# Patient Record
Sex: FEMALE | Race: OTHER | Employment: UNEMPLOYED | URBAN - METROPOLITAN AREA
[De-identification: names, ages, dates, MRNs, and addresses within clinical notes are randomized per-mention and may not be internally consistent; named-entity substitution may affect disease eponyms.]

---

## 2019-04-04 ENCOUNTER — APPOINTMENT (OUTPATIENT)
Dept: ULTRASOUND IMAGING | Age: 39
End: 2019-04-04
Attending: PHYSICIAN ASSISTANT
Payer: SELF-PAY

## 2019-04-04 ENCOUNTER — APPOINTMENT (OUTPATIENT)
Dept: CT IMAGING | Age: 39
End: 2019-04-04
Attending: PHYSICIAN ASSISTANT
Payer: SELF-PAY

## 2019-04-04 ENCOUNTER — HOSPITAL ENCOUNTER (EMERGENCY)
Age: 39
Discharge: HOME | End: 2019-04-04
Attending: EMERGENCY MEDICINE
Payer: SELF-PAY

## 2019-04-04 VITALS
OXYGEN SATURATION: 95 % | TEMPERATURE: 98.9 F | HEART RATE: 107 BPM | DIASTOLIC BLOOD PRESSURE: 88 MMHG | RESPIRATION RATE: 18 BRPM | SYSTOLIC BLOOD PRESSURE: 129 MMHG

## 2019-04-04 DIAGNOSIS — R51.9 NONINTRACTABLE HEADACHE, UNSPECIFIED CHRONICITY PATTERN, UNSPECIFIED HEADACHE TYPE: ICD-10-CM

## 2019-04-04 DIAGNOSIS — R10.84 ABDOMINAL PAIN, GENERALIZED: Primary | ICD-10-CM

## 2019-04-04 DIAGNOSIS — R11.2 NAUSEA VOMITING AND DIARRHEA: ICD-10-CM

## 2019-04-04 DIAGNOSIS — R19.7 NAUSEA VOMITING AND DIARRHEA: ICD-10-CM

## 2019-04-04 LAB
ALBUMIN SERPL-MCNC: 4.2 G/DL (ref 3.5–5)
ALBUMIN/GLOB SERPL: 0.9 {RATIO} (ref 1.1–2.2)
ALP SERPL-CCNC: 152 U/L (ref 45–117)
ALT SERPL-CCNC: 64 U/L (ref 12–78)
AMORPH CRY URNS QL MICRO: ABNORMAL
ANION GAP SERPL CALC-SCNC: 8 MMOL/L (ref 5–15)
APPEARANCE UR: ABNORMAL
AST SERPL-CCNC: 37 U/L (ref 15–37)
BACTERIA URNS QL MICRO: NEGATIVE /HPF
BASOPHILS # BLD: 0 K/UL (ref 0–0.1)
BASOPHILS NFR BLD: 0 % (ref 0–1)
BILIRUB SERPL-MCNC: 0.5 MG/DL (ref 0.2–1)
BILIRUB UR QL CFM: NEGATIVE
BUN SERPL-MCNC: 17 MG/DL (ref 6–20)
BUN/CREAT SERPL: 24 (ref 12–20)
CALCIUM SERPL-MCNC: 9.4 MG/DL (ref 8.5–10.1)
CHLORIDE SERPL-SCNC: 104 MMOL/L (ref 97–108)
CO2 SERPL-SCNC: 24 MMOL/L (ref 21–32)
COLOR UR: ABNORMAL
COMMENT, HOLDF: NORMAL
CREAT SERPL-MCNC: 0.7 MG/DL (ref 0.55–1.02)
DIFFERENTIAL METHOD BLD: ABNORMAL
EOSINOPHIL # BLD: 0 K/UL (ref 0–0.4)
EOSINOPHIL NFR BLD: 0 % (ref 0–7)
EPITH CASTS URNS QL MICRO: ABNORMAL /LPF
ERYTHROCYTE [DISTWIDTH] IN BLOOD BY AUTOMATED COUNT: 13 % (ref 11.5–14.5)
GLOBULIN SER CALC-MCNC: 4.6 G/DL (ref 2–4)
GLUCOSE SERPL-MCNC: 116 MG/DL (ref 65–100)
GLUCOSE UR STRIP.AUTO-MCNC: NEGATIVE MG/DL
HCG UR QL: NEGATIVE
HCT VFR BLD AUTO: 46.8 % (ref 35–47)
HGB BLD-MCNC: 15 G/DL (ref 11.5–16)
HGB UR QL STRIP: NEGATIVE
HYALINE CASTS URNS QL MICRO: ABNORMAL /LPF (ref 0–5)
IMM GRANULOCYTES # BLD AUTO: 0 K/UL (ref 0–0.04)
IMM GRANULOCYTES NFR BLD AUTO: 0 % (ref 0–0.5)
KETONES UR QL STRIP.AUTO: ABNORMAL MG/DL
LEUKOCYTE ESTERASE UR QL STRIP.AUTO: NEGATIVE
LIPASE SERPL-CCNC: 109 U/L (ref 73–393)
LYMPHOCYTES # BLD: 0.7 K/UL (ref 0.8–3.5)
LYMPHOCYTES NFR BLD: 7 % (ref 12–49)
MCH RBC QN AUTO: 29.1 PG (ref 26–34)
MCHC RBC AUTO-ENTMCNC: 32.1 G/DL (ref 30–36.5)
MCV RBC AUTO: 90.7 FL (ref 80–99)
MONOCYTES # BLD: 0.5 K/UL (ref 0–1)
MONOCYTES NFR BLD: 5 % (ref 5–13)
NEUTS SEG # BLD: 8.5 K/UL (ref 1.8–8)
NEUTS SEG NFR BLD: 88 % (ref 32–75)
NITRITE UR QL STRIP.AUTO: NEGATIVE
NRBC # BLD: 0 K/UL (ref 0–0.01)
NRBC BLD-RTO: 0 PER 100 WBC
PH UR STRIP: 5.5 [PH] (ref 5–8)
PLATELET # BLD AUTO: 278 K/UL (ref 150–400)
PMV BLD AUTO: 9.4 FL (ref 8.9–12.9)
POTASSIUM SERPL-SCNC: 3.6 MMOL/L (ref 3.5–5.1)
PROT SERPL-MCNC: 8.8 G/DL (ref 6.4–8.2)
PROT UR STRIP-MCNC: 100 MG/DL
RBC # BLD AUTO: 5.16 M/UL (ref 3.8–5.2)
RBC #/AREA URNS HPF: ABNORMAL /HPF (ref 0–5)
RBC MORPH BLD: ABNORMAL
SAMPLES BEING HELD,HOLD: NORMAL
SODIUM SERPL-SCNC: 136 MMOL/L (ref 136–145)
SP GR UR REFRACTOMETRY: 1.03 (ref 1–1.03)
UR CULT HOLD, URHOLD: NORMAL
UROBILINOGEN UR QL STRIP.AUTO: 0.2 EU/DL (ref 0.2–1)
WBC # BLD AUTO: 9.7 K/UL (ref 3.6–11)
WBC URNS QL MICRO: ABNORMAL /HPF (ref 0–4)

## 2019-04-04 PROCEDURE — 96361 HYDRATE IV INFUSION ADD-ON: CPT

## 2019-04-04 PROCEDURE — 80053 COMPREHEN METABOLIC PANEL: CPT

## 2019-04-04 PROCEDURE — 76705 ECHO EXAM OF ABDOMEN: CPT

## 2019-04-04 PROCEDURE — 70450 CT HEAD/BRAIN W/O DYE: CPT

## 2019-04-04 PROCEDURE — 83690 ASSAY OF LIPASE: CPT

## 2019-04-04 PROCEDURE — 81001 URINALYSIS AUTO W/SCOPE: CPT

## 2019-04-04 PROCEDURE — 96374 THER/PROPH/DIAG INJ IV PUSH: CPT

## 2019-04-04 PROCEDURE — 36415 COLL VENOUS BLD VENIPUNCTURE: CPT

## 2019-04-04 PROCEDURE — 74011250636 HC RX REV CODE- 250/636: Performed by: PHYSICIAN ASSISTANT

## 2019-04-04 PROCEDURE — 99283 EMERGENCY DEPT VISIT LOW MDM: CPT

## 2019-04-04 PROCEDURE — 85025 COMPLETE CBC W/AUTO DIFF WBC: CPT

## 2019-04-04 PROCEDURE — 81025 URINE PREGNANCY TEST: CPT

## 2019-04-04 PROCEDURE — 96375 TX/PRO/DX INJ NEW DRUG ADDON: CPT

## 2019-04-04 RX ORDER — DIPHENHYDRAMINE HYDROCHLORIDE 50 MG/ML
25 INJECTION, SOLUTION INTRAMUSCULAR; INTRAVENOUS
Status: COMPLETED | OUTPATIENT
Start: 2019-04-04 | End: 2019-04-04

## 2019-04-04 RX ORDER — KETOROLAC TROMETHAMINE 30 MG/ML
30 INJECTION, SOLUTION INTRAMUSCULAR; INTRAVENOUS
Status: COMPLETED | OUTPATIENT
Start: 2019-04-04 | End: 2019-04-04

## 2019-04-04 RX ORDER — SODIUM CHLORIDE 9 MG/ML
1000 INJECTION, SOLUTION INTRAVENOUS ONCE
Status: COMPLETED | OUTPATIENT
Start: 2019-04-04 | End: 2019-04-04

## 2019-04-04 RX ORDER — ONDANSETRON 2 MG/ML
4 INJECTION INTRAMUSCULAR; INTRAVENOUS
Status: COMPLETED | OUTPATIENT
Start: 2019-04-04 | End: 2019-04-04

## 2019-04-04 RX ORDER — ONDANSETRON 8 MG/1
8 TABLET, ORALLY DISINTEGRATING ORAL
Qty: 12 TAB | Refills: 0 | Status: SHIPPED | OUTPATIENT
Start: 2019-04-04

## 2019-04-04 RX ORDER — DICYCLOMINE HYDROCHLORIDE 20 MG/1
20 TABLET ORAL EVERY 6 HOURS
Qty: 20 TAB | Refills: 0 | Status: SHIPPED | OUTPATIENT
Start: 2019-04-04 | End: 2019-04-09

## 2019-04-04 RX ADMIN — DIPHENHYDRAMINE HYDROCHLORIDE 25 MG: 50 INJECTION, SOLUTION INTRAMUSCULAR; INTRAVENOUS at 20:39

## 2019-04-04 RX ADMIN — KETOROLAC TROMETHAMINE 30 MG: 30 INJECTION, SOLUTION INTRAMUSCULAR; INTRAVENOUS at 20:38

## 2019-04-04 RX ADMIN — ONDANSETRON 4 MG: 2 INJECTION INTRAMUSCULAR; INTRAVENOUS at 20:36

## 2019-04-04 RX ADMIN — SODIUM CHLORIDE 1000 ML/HR: 900 INJECTION, SOLUTION INTRAVENOUS at 20:35

## 2019-04-04 NOTE — ED PROVIDER NOTES
42 yo  female with complaint of generalized HA, throbbing, lighter similar HA present in past since yesterday. No associated fever, rash, neck stiffness, extremity numbness or weakness. Photophobia associated. No head injury. No medications for pain prior to arrival.  
Also reports generalized abdominal pain with associated nausea, vomiting and diarrhea. No ill contacts with same. No recent travel. No recent or new medications. Denies fever, cough, runny nose, sore throat, CP, SOB or urinary complaint. No past medical history on file. No past surgical history on file. No family history on file. Social History Socioeconomic History  Marital status: Not on file Spouse name: Not on file  Number of children: Not on file  Years of education: Not on file  Highest education level: Not on file Occupational History  Not on file Social Needs  Financial resource strain: Not on file  Food insecurity:  
  Worry: Not on file Inability: Not on file  Transportation needs:  
  Medical: Not on file Non-medical: Not on file Tobacco Use  Smoking status: Not on file Substance and Sexual Activity  Alcohol use: Not on file  Drug use: Not on file  Sexual activity: Not on file Lifestyle  Physical activity:  
  Days per week: Not on file Minutes per session: Not on file  Stress: Not on file Relationships  Social connections:  
  Talks on phone: Not on file Gets together: Not on file Attends [employer] service: Not on file Active member of club or organization: Not on file Attends meetings of clubs or organizations: Not on file Relationship status: Not on file  Intimate partner violence:  
  Fear of current or ex partner: Not on file Emotionally abused: Not on file Physically abused: Not on file Forced sexual activity: Not on file Other Topics Concern  Not on file Social History Narrative  Not on file ALLERGIES: Patient has no allergy information on record. Review of Systems Constitutional: Negative. Negative for chills, fatigue and fever. HENT: Negative. Negative for congestion, ear pain, facial swelling, rhinorrhea, sneezing and sore throat. Eyes: Positive for photophobia. Negative for pain, discharge and itching. Respiratory: Negative for cough, chest tightness and shortness of breath. Cardiovascular: Negative. Negative for chest pain and leg swelling. Gastrointestinal: Positive for abdominal pain, diarrhea, nausea and vomiting. Negative for abdominal distention and constipation. Genitourinary: Negative for difficulty urinating, frequency and urgency. Musculoskeletal: Negative for neck pain and neck stiffness. Skin: Negative for rash. Neurological: Positive for headaches. Negative for dizziness and numbness. All other systems reviewed and are negative. Vitals:  
 04/04/19 1633 Pulse: (!) 111 SpO2: 98% Physical Exam  
Constitutional: She is oriented to person, place, and time. She appears well-developed and well-nourished. No distress. Well appearing  female in NAd HENT:  
Head: Normocephalic and atraumatic. Left Ear: External ear normal.  
Mouth/Throat: Oropharynx is clear and moist. No oropharyngeal exudate. Eyes: Pupils are equal, round, and reactive to light. Conjunctivae and EOM are normal.  
Photophobic Neck: Normal range of motion and full passive range of motion without pain. Neck supple. Cardiovascular: Normal rate, regular rhythm and normal heart sounds. Pulmonary/Chest: Effort normal. No respiratory distress. She has no wheezes. Abdominal: Soft. Bowel sounds are normal. She exhibits no distension. There is generalized tenderness. There is no rebound. Musculoskeletal: Normal range of motion. Neurological: She is alert and oriented to person, place, and time. Skin: Skin is warm and dry. No ecchymosis, no laceration and no lesion noted. Nursing note and vitals reviewed. MDM Number of Diagnoses or Management Options Abdominal pain, generalized:  
Nausea vomiting and diarrhea:  
Nonintractable headache, unspecified chronicity pattern, unspecified headache type:  
Diagnosis management comments: 42 yo  female with complaint of generalized HA since yesterday. Also with abdominal pain, nausea, vomiting and diarrhea. Appears well with stable reassuring vitals and PE findings. Suspect acute gastroenteritis +/- HA. No e/o meningeal irritation. Plan CBC 
CMP 
CT head US abd/ 
Analgesia IVF Antiemetic therapy Reassess. Mia Mccollumma Amount and/or Complexity of Data Reviewed Clinical lab tests: ordered and reviewed Tests in the radiology section of CPT®: ordered and reviewed Independent visualization of images, tracings, or specimens: yes Procedures Progress note Labs and imaging reviewed. Pt feeling better. Tolerating PO. Ilsa Vance [place] Patient's results have been reviewed with them. Patient and/or family have verbally conveyed their understanding and agreement of the patient's signs, symptoms, diagnosis, treatment and prognosis and additionally agree to follow up as recommended or return to the Emergency Room should their condition change prior to follow-up. Discharge instructions have also been provided to the patient with some educational information regarding their diagnosis as well a list of reasons why they would want to return to the ER prior to their follow-up appointment should their condition change.  [facility]lsa Vance [facility]

## 2019-04-05 NOTE — DISCHARGE INSTRUCTIONS
Patient Education        Dolor abdominal: Instrucciones de cuidado - [ Abdominal Pain: Care Instructions ]  Instrucciones de cuidado    El dolor abdominal tiene muchas causas posibles. Algunas de ellas no son graves y mejoran por sí solas en unos días. Otras requieren Mia Cement y [place]. Si alcala dolor continúa o [place], necesitará leander nueva revisión y [place] pruebas para determinar qué pasa. Es posible que necesite cirugía para corregir el problema. No ignore nuevos síntomas, guero fiebre, náuseas y [place], [address], dolor que [place] o [place]. Podrían ser señales de un problema más grave. Alcala médico puede haberle recomendado leander consulta de Rahul & Jatinder las 8 o 12 horas siguientes. Si no se siente mejor, es posible que requiera Mia Mando o [place]. El médico lo hawkins revisado minuciosamente, jesusita puede nory problemas más tarde. Si nota algún problema o síntomas nuevos, busque tratamiento médico inmediatamente. La atención de seguimiento es leander parte clave de alcala tratamiento y seguridad. Asegúrese de hacer y acudir a todas las citas, y llame a alcala médico si está teniendo problemas. También es leander buena idea saber los resultados de nabeel exámenes y mantener leander lista de los medicamentos que lo. ¿Cómo puede cuidarse en el hogar? · Descanse hasta que se sienta mejor. · Para prevenir la deshidratación, sofia abundantes líquidos, suficientes para que alcala orina sea de color amarillo zhao o transparente guero el agua. Elija beber agua y otros líquidos kristofer sin cafeína hasta que se sienta mejor. Si tiene [facility] [employer], del corazón o del hígado y tiene que [place] líquidos, hable con alcala médico antes de aumentar alcala consumo. · Si tiene [employer], coma alimentos suaves, guero arroz, pan león seco o galletas saladas, bananas (plátanos) y puré de Synchari. Trate de comer varias comidas pequeñas al día en lugar de dos o shireen grandes.   · Espere hasta 48 horas después de que todos los síntomas hayan desaparecido antes de comer alimentos condimentados, alcohol y bebidas que contengan cafeína. · No consuma alimentos ricos en grasa. · Evite medicamentos antiinflamatorios guero aspirina, ibuprofeno (Advil, Motrin) y naproxeno (Aleve). Pueden causar [employer]. Dígale a alcala médico si está tomando aspirina diariamente debido a otro problema de hira. ¿Cuándo debe pedir ayuda? Llame al 911 en cualquier momento que considere que necesita atención de emergencia. Por ejemplo, llame si:    · Se desmayó (perdió el conocimiento).   · Las heces son de color rojizo o muy sanguinolentas (con geensis).   · Vomita genesis o algo parecido a granos de café molido.     · Tiene dolor abdominal nuevo e intenso.    Llame a alcala médico ahora mismo o busque atención médica inmediata si:    · Alcala dolor empeora, sobre todo si se concentra en leander sakshi parte del vientre.     · Vuelve a tener fiebre o tiene fiebre más mira.     · Monica heces son negruzcas y parecidas al alquitrán o tienen rastros de genesis.     · Tiene sangrado vaginal inesperado.     · Tiene síntomas de leander infección del tracto urinario. Estos podrían incluir:  ? Dolor al Chang Mater. ? Orinar con más frecuencia que lo habitual.  ? Genesis en la [facility].     · Siente mareos o aturdimiento, o que está a punto de desmayarse.    Preste especial atención a los cambios en alcala hira y asegúrese de comunicarse con alcala médico si:    · No está mejorando después de 1 día (24 horas). ¿Dónde puede encontrar más información en inglés? [place] a http://luis carlos-karlo.info/. [place] M172 en la búsqueda para aprender más acerca de \"Dolor abdominal: Instrucciones de cuidado - [ Abdominal Pain: Care Instructions ]. \"  Revisado: 23 septiembre, 2018  Versión del contenido: 11.9  © 3255-2442 [employer].  Las instrucciones de cuidado fueron adaptadas bajo licencia por [employer] (which disclaims liability or warranty for this information). Si usted tiene [place] afección médica o sobre estas instrucciones, siempre pregunte a alcala profesional de hira. [employer] niega toda garantía o responsabilidad por alcala uso de esta información. Patient Education        Diarrea: Instrucciones de cuidado - [ Diarrhea: Care Instructions ]  Instrucciones de cuidado    La diarrea es la evacuación de heces flojas y acuosas. Con frecuencia, la causa exacta es difícil de determinar. A veces, la diarrea es la manera que tiene el cuerpo de eliminar lo que le causó malestar estomacal. Los virus, la intoxicación por alimentos y muchos medicamentos pueden provocar diarrea. Algunas personas tienen diarrea guero respuesta al estrés emocional, la ansiedad o determinados alimentos. Eva todos tenemos diarrea de vez en cuando. Por lo general, no es grave y las heces regresarán pronto a la normalidad. Lo importante es que debe reponer los líquidos perdidos para que pueda prevenir la deshidratación. El médico lo hawkins revisado minuciosamente, jesusita se pueden presentar problemas más tarde. Si nota algún problema o síntomas nuevos, busque tratamiento médico inmediatamente. La atención de seguimiento es leander parte clave de alcala tratamiento y seguridad. Asegúrese de hacer y acudir a todas las citas, y llame a alcala médico si está teniendo problemas. También es leander buena idea saber los resultados de nabeel exámenes y mantener leander lista de los medicamentos que lo. ¿Cómo puede cuidarse en el hogar? · Esté atento a señales de deshidratación, lo que significa que alcala cuerpo hawkins perdido [facility]. La deshidratación es un problema médico grave y debe tratarse de inmediato. Las señales de la deshidratación son:  ? Aumento de sed y sequedad de la boca y los ojos. ? Sensación de desmayo o aturdimiento.   ? [place] y en misa cantidad de lo normal.  · Para prevenir la deshidratación, sofia abundantes líquidos, los suficientes para que alcala [facility] sea de color amarillo zhao o transparente guero el agua. Elija beber agua y otros líquidos kristofer sin cafeína hasta que se sienta mejor. Si tiene [employer], del corazón o del hígado y tiene que Alisa's líquidos, hable con alcala médico antes de aumentar alcala consumo. · Comience comiendo cantidades pequeñas de alimentos suaves al día siguiente, si tiene ganas. ? Pruebe con yogur que tenga cultivos vivos de lactobacilos. (Leyda la etiqueta). ? Evite las comidas muy condimentadas, las frutas, el alcohol y la cafeína hasta 50 horas después de que desaparezcan todos los síntomas. ? Evite los chicles que contengan sorbitol. ? Evite los productos lácteos (excepto el yogur con lactobacilos) mientras tenga diarrea y celestino 3 días después de que hayan desaparecido los síntomas. · El médico podría recomendarle que tome medicamentos de venta mervat, guero loperamida (Imodium), si persiste la diarrea después de 6 horas. Leyda y siga todas las instrucciones de la [place]. No use geoffrey medicamento si tiene diarrea sanguinolenta (con genesis), fiebre mira u otras señales de enfermedad grave. Llame a alcala médico si zak estar teniendo problemas con alcala medicamento. ¿Cuándo debe pedir ayuda? Llame al 911 en cualquier momento que considere que necesita atención de [place]. Por ejemplo, llame si:    · Se desmayó (perdió el conocimiento).   · Las heces son de color rojizo o muy sanguinolentas (con genesis).    Llame a alcala médico ahora mismo o busque atención médica inmediata si:    · Siente mareo o aturdimiento, o siente que se va a desmayar.     · Monica heces son negruzcas y parecidas al alquitrán o tienen rastros de genesis.     · Tiene dolor abdominal nuevo o peor.     · Tiene síntomas de deshidratación, tales guero:  ? [place] [employer] y ojos secos. ? [place] orina de color oscuro. ?  Tener más sed de lo normal.     · Tiene fiebre nueva o más mira.    Preste especial atención a los cambios en alcala hira y asegúrese de comunicarse con alcala médico si:    · La diarrea está empeorando.     · Ve pus en la diarrea.     · No está mejorando después de 2 días (48 horas). ¿Dónde puede encontrar más información en inglés? Herrera Nance a http://luis carlos-karlo.info/. Beatrice Chaconer Z969 en la búsqueda para aprender Linh Gianotti de \"Diarrea: Instrucciones de cuidado - [ Diarrhea: Care Instructions ]. \"  Revisado: 23 septiembre, 2018  Versión del contenido: 11.9  © 5811-5920 [employer]. Las instrucciones de cuidado fueron adaptadas bajo licencia por [employer] (which disclaims liability or warranty for this information). Si usted tiene [place] afección médica o sobre estas instrucciones, siempre pregunte a alcala profesional de hira. [employer] Inc[employer] niega toda garantía o responsabilidad por alcala uso de esta información.

## 2019-04-05 NOTE — ED NOTES
Discharge instructions given to patient by April, Miama. All questions answered and patient verbalized understanding. Patient ambulatory to ED desiree